# Patient Record
Sex: MALE | Race: OTHER | HISPANIC OR LATINO | Employment: OTHER | URBAN - METROPOLITAN AREA
[De-identification: names, ages, dates, MRNs, and addresses within clinical notes are randomized per-mention and may not be internally consistent; named-entity substitution may affect disease eponyms.]

---

## 2020-12-22 ENCOUNTER — VIRTUAL VISIT (OUTPATIENT)
Dept: FAMILY MEDICINE CLINIC | Age: 45
End: 2020-12-22

## 2020-12-22 DIAGNOSIS — G89.29 CHRONIC LOW BACK PAIN WITHOUT SCIATICA, UNSPECIFIED BACK PAIN LATERALITY: Primary | ICD-10-CM

## 2020-12-22 DIAGNOSIS — M54.50 CHRONIC LOW BACK PAIN WITHOUT SCIATICA, UNSPECIFIED BACK PAIN LATERALITY: Primary | ICD-10-CM

## 2020-12-22 PROCEDURE — 99443 PR PHYS/QHP TELEPHONE EVALUATION 21-30 MIN: CPT | Performed by: NURSE PRACTITIONER

## 2020-12-22 NOTE — PROGRESS NOTES
Avs discussed with Jeanette Alexander by Discharge Nurse Rommel Valentine LPN. Discussed patient getting covid testing done. Info given to patient on testing sites in his area. Patient verbalized understanding and has no further questions.   Rommel Valentine LPN

## 2020-12-22 NOTE — PROGRESS NOTES
: Parveen Salcedo  Patient identification verified with 2 identifiers. Consent: He and/or health care decision maker has provided verbal consent to proceed: Yes   Total Time: minutes: 21-30 minutes  Pursuant to the emergency declaration under the [facility] authority and the [employer] Act, this Virtual Telephone Visit was conducted to reduce the patient's risk of exposure to COVID-19. Assessment:   Diagnoses and all orders for this visit:    1. Chronic low back pain without sciatica, unspecified back pain laterality      Plan:  I communicated with the patient and/or health care decision maker about the following:    -get covid testing (chest pressure, difficulty breathing)  -then plan to see FATEMEH Antony is a 50 y.o. male evaluated via telephone on 12/22/2020. Patient identification verified with 2 identifiers. Chief Complaint   Patient presents with    Back Pain     pt c/o low back pain.  Other     pt requesting wellness check. History of Present Illness   Back pain for how long? A year. No injury. He often lifts heavy things of at least 50 lbs. Lower back in the middle. Able to work and to sit but feeling tired. He feels pressure in his chest. He states he sometimes has difficulty with breathing. It is a little bit. But not today. This has been going on for 1 year. He has pain in the left side of the chest without radiation. It's not severe (1/10), can last up to 5 hours or all day long, if he's had a bad day. It can get up to an 8/10, not today. Also feels like he wants to cry. He has history of a hernia in his belly button, 10 years ago. Denies SI/HI. Also has stabbing pain in the upper right abdomen once in a while, lasts for 1-2 seconds. Sometimes he feels a burning sensation when he urinates, not today.    No physical exam performed due to telephone visit. I affirm this is a Patient Initiated Episode with a Patient who has not had a related appointment within my department in the past 7 days or scheduled within the next 24 hours. ABELINO Russell expressed understanding and agreed to this plan.

## 2020-12-22 NOTE — PROGRESS NOTES
Coordination of Care  1. Have you been to the ER, urgent care clinic since your last visit? Hospitalized since your last visit? No    2. Have you seen or consulted any other health care providers outside of the [address] [place] [address] since your last visit? Include any pap smears or colon screening. No    Does the patient need refills? NO    Learning Assessment Complete? yes  Depression Screening complete in the past 12 months? yes    Per patient states last week he had fevers, chills, headaches, loss of appetite, muscle pain but he feels better now.

## 2021-01-04 ENCOUNTER — TELEPHONE (OUTPATIENT)
Dept: FAMILY MEDICINE CLINIC | Age: 46
End: 2021-01-04

## 2021-01-04 ENCOUNTER — HOSPITAL ENCOUNTER (OUTPATIENT)
Dept: LAB | Age: 46
Discharge: HOME | End: 2021-01-04

## 2021-01-04 ENCOUNTER — OFFICE VISIT (OUTPATIENT)
Dept: FAMILY MEDICINE CLINIC | Age: 46
End: 2021-01-04

## 2021-01-04 VITALS
HEIGHT: 70 IN | TEMPERATURE: 97.7 F | HEART RATE: 105 BPM | WEIGHT: 277 LBS | DIASTOLIC BLOOD PRESSURE: 73 MMHG | SYSTOLIC BLOOD PRESSURE: 139 MMHG | BODY MASS INDEX: 39.65 KG/M2

## 2021-01-04 DIAGNOSIS — R30.0 DYSURIA: Primary | ICD-10-CM

## 2021-01-04 DIAGNOSIS — R30.0 DYSURIA: ICD-10-CM

## 2021-01-04 DIAGNOSIS — L29.0 PRURITUS ANI: ICD-10-CM

## 2021-01-04 DIAGNOSIS — R39.9 LOWER URINARY TRACT SYMPTOMS (LUTS): ICD-10-CM

## 2021-01-04 LAB
BILIRUB UR QL STRIP: NEGATIVE
GLUCOSE UR-MCNC: NEGATIVE MG/DL
KETONES P FAST UR STRIP-MCNC: NEGATIVE MG/DL
PH UR STRIP: 6 [PH] (ref 4.6–8)
PROT UR QL STRIP: NEGATIVE
SP GR UR STRIP: 1.03 (ref 1–1.03)
UA UROBILINOGEN AMB POC: NORMAL (ref 0.2–1)
URINALYSIS CLARITY POC: CLEAR
URINALYSIS COLOR POC: NORMAL
URINE BLOOD POC: NORMAL
URINE LEUKOCYTES POC: NEGATIVE
URINE NITRITES POC: NEGATIVE

## 2021-01-04 PROCEDURE — 83036 HEMOGLOBIN GLYCOSYLATED A1C: CPT

## 2021-01-04 PROCEDURE — 99213 OFFICE O/P EST LOW 20 MIN: CPT | Performed by: FAMILY MEDICINE

## 2021-01-04 PROCEDURE — 80053 COMPREHEN METABOLIC PANEL: CPT

## 2021-01-04 PROCEDURE — 84153 ASSAY OF PSA TOTAL: CPT

## 2021-01-04 PROCEDURE — 87086 URINE CULTURE/COLONY COUNT: CPT

## 2021-01-04 PROCEDURE — 81002 URINALYSIS NONAUTO W/O SCOPE: CPT | Performed by: FAMILY MEDICINE

## 2021-01-04 PROCEDURE — 87491 CHLMYD TRACH DNA AMP PROBE: CPT

## 2021-01-04 RX ORDER — TAMSULOSIN HYDROCHLORIDE 0.4 MG/1
0.4 CAPSULE ORAL DAILY
Qty: 30 CAP | Refills: 3 | Status: SHIPPED | OUTPATIENT
Start: 2021-01-04

## 2021-01-04 RX ORDER — CIPROFLOXACIN 500 MG/1
500 TABLET ORAL 2 TIMES DAILY
Qty: 20 TAB | Refills: 0 | Status: SHIPPED | OUTPATIENT
Start: 2021-01-04 | End: 2021-01-14

## 2021-01-04 NOTE — PROGRESS NOTES
Coordination of Care  1. Have you been to the ER, urgent care clinic since your last visit? Hospitalized since your last visit? No    2. Have you seen or consulted any other health care providers outside of the [address] [place] [address] since your last visit? Include any pap smears or colon screening. No    Does the patient need refills? NO    Learning Assessment Complete?  yes  Depression Screening complete in the past 12 months? yes     Results for orders placed or performed in visit on 01/04/21   AMB POC URINALYSIS DIP STICK MANUAL W/O MICRO   Result Value Ref Range    Color (UA POC) Dark Yellow     Clarity (UA POC) Clear     Glucose (UA POC) Negative Negative    Bilirubin (UA POC) Negative Negative    Ketones (UA POC) Negative Negative    Specific gravity (UA POC) 1.030 1.001 - 1.035    Blood (UA POC) Trace Negative    pH (UA POC) 6.0 4.6 - 8.0    Protein (UA POC) Negative Negative    Urobilinogen (UA POC) 0.2 mg/dL 0.2 - 1    Nitrites (UA POC) Negative Negative    Leukocyte esterase (UA POC) Negative Negative

## 2021-01-04 NOTE — PROGRESS NOTES
HISTORY OF PRESENT ILLNESS  Reid Martell is a 45 y.o. male.  HPI  Patient states he has noticed burning sensation when he urinates, he has noticed this for the past 3-4 months.   He has been waking up at night to urinate.  He has noticed also some more resistance to pass urine.  He has not seen blood in the urine.  Recalls his father had a problem with prostate (father passed away with COVID a few months ago)    He has some rectal itch as well, for the past 3-4 weeks.  He has had some episodes of blood in stools and constipation.  Review of Systems   Constitutional: Negative for chills and fever.   HENT: Negative for ear pain, hearing loss and tinnitus.    Respiratory: Negative for cough, hemoptysis and sputum production.    Cardiovascular: Negative for chest pain and palpitations.   Gastrointestinal:        Rectal itch and episodes of blood in stool   Genitourinary: Positive for dysuria.   Skin: Negative for itching and rash.   /73 (BP 1 Location: Left arm, BP Patient Position: Sitting)   Pulse (!) 105   Temp 97.7 °F (36.5 °C)   Ht 5' 10.28\" (1.785 m)   Wt 277 lb (125.6 kg)   BMI 39.43 kg/m²   Physical Exam  Constitutional:       General: He is not in acute distress.     Appearance: Normal appearance. He is not ill-appearing.   HENT:      Head: Normocephalic.      Right Ear: Tympanic membrane normal.      Left Ear: Tympanic membrane normal.   Neck:      Musculoskeletal: Normal range of motion. No neck rigidity or muscular tenderness.   Cardiovascular:      Rate and Rhythm: Normal rate and regular rhythm.      Pulses: Normal pulses.      Heart sounds: No murmur.   Pulmonary:      Effort: Pulmonary effort is normal. No respiratory distress.      Breath sounds: No wheezing or rhonchi.   Genitourinary:     Comments: There is perianal irritation  Musculoskeletal:         General: No swelling or tenderness.   Neurological:      Mental Status: He is alert.         ASSESSMENT and  PLAN  Diagnoses and all orders for this visit:    1. Dysuria  -     AMB POC URINALYSIS DIP STICK MANUAL W/O MICRO  -     METABOLIC PANEL, COMPREHENSIVE; Future  -     HEMOGLOBIN A1C WITH EAG; Future  -     CULTURE, URINE; Future  -     CHLAMYDIA/GC PCR; Future  -     ciprofloxacin HCl (CIPRO) 500 mg tablet; Take 1 Tab by mouth two (2) times a day for 10 days. 2. Lower urinary tract symptoms (LUTS)  -     PSA, DIAGNOSTIC (PROSTATE SPECIFIC AG); Future  -     tamsulosin (Flomax) 0.4 mg capsule; Take 1 Cap by mouth daily. 3. Pruritus ani      We will check labs today  Start flomax for LUTS  Advise on dietary changes, may need to avoid the use of toilet paper as well.

## 2021-01-05 LAB
ALBUMIN SERPL-MCNC: 3.8 G/DL (ref 3.5–5)
ALBUMIN/GLOB SERPL: 1 {RATIO} (ref 1.1–2.2)
ALP SERPL-CCNC: 134 U/L (ref 45–117)
ALT SERPL-CCNC: 35 U/L (ref 12–78)
ANION GAP SERPL CALC-SCNC: 6 MMOL/L (ref 5–15)
AST SERPL-CCNC: 21 U/L (ref 15–37)
BILIRUB SERPL-MCNC: 0.4 MG/DL (ref 0.2–1)
BUN SERPL-MCNC: 8 MG/DL (ref 6–20)
BUN/CREAT SERPL: 10 (ref 12–20)
CALCIUM SERPL-MCNC: 8.6 MG/DL (ref 8.5–10.1)
CHLORIDE SERPL-SCNC: 107 MMOL/L (ref 97–108)
CO2 SERPL-SCNC: 28 MMOL/L (ref 21–32)
CREAT SERPL-MCNC: 0.82 MG/DL (ref 0.7–1.3)
EST. AVERAGE GLUCOSE BLD GHB EST-MCNC: 103 MG/DL
GLOBULIN SER CALC-MCNC: 4 G/DL (ref 2–4)
GLUCOSE SERPL-MCNC: 90 MG/DL (ref 65–100)
HBA1C MFR BLD: 5.2 % (ref 4–5.6)
POTASSIUM SERPL-SCNC: 4.1 MMOL/L (ref 3.5–5.1)
PROT SERPL-MCNC: 7.8 G/DL (ref 6.4–8.2)
PSA SERPL-MCNC: 1.2 NG/ML (ref 0.01–4)
SODIUM SERPL-SCNC: 141 MMOL/L (ref 136–145)

## 2021-01-06 LAB
BACTERIA SPEC CULT: NORMAL
C TRACH DNA SPEC QL NAA+PROBE: NEGATIVE
N GONORRHOEA DNA SPEC QL NAA+PROBE: NEGATIVE
SAMPLE TYPE: NORMAL
SERVICE CMNT-IMP: NORMAL
SERVICE CMNT-IMP: NORMAL
SPECIMEN SOURCE: NORMAL

## 2021-01-06 NOTE — PROGRESS NOTES
Normal prostate test in blood  Normal liver function, kidney function, electrolytes and sugar levels  No diabetes    We are still waiting on more results, patient can be informed

## 2021-01-07 NOTE — PROGRESS NOTES
Left a generic message for patient today asking the patient to call back to speak with a nurse for message from provider.

## 2021-01-07 NOTE — PROGRESS NOTES
Left a generic message for patient today asking the patient to call back to speak with a nurse for message from provider.  Danisha Pitts RN Patient called again.  Message left with negative results.  F/U with PMD as needed.  Call back with any questions.  Abrahan

## 2021-01-08 ENCOUNTER — TELEPHONE (OUTPATIENT)
Dept: FAMILY MEDICINE CLINIC | Age: 46
End: 2021-01-08

## 2021-01-08 NOTE — PROGRESS NOTES
Tc to the pt he had called the interpreters personal cell and left a message. The pt was spoken to he gave permission for his Dtr Annabella Devries to interpret for him. She is also on his PHI. The pt's dtr was given all of the pt's lab results.  They were all normal. Seda Ross RN

## 2021-01-08 NOTE — TELEPHONE ENCOUNTER
Daughter, Blayne Osorio (who is on the new PHI which will be scanned) called the nurse back about her fathers lab results.

## 2021-04-06 ENCOUNTER — VIRTUAL VISIT (OUTPATIENT)
Dept: FAMILY MEDICINE CLINIC | Age: 46
End: 2021-04-06

## 2021-04-06 DIAGNOSIS — K62.5 RECTAL BLEEDING: ICD-10-CM

## 2021-04-06 DIAGNOSIS — K59.00 CONSTIPATION, UNSPECIFIED CONSTIPATION TYPE: Primary | ICD-10-CM

## 2021-04-06 PROCEDURE — 99441 PR PHYS/QHP TELEPHONE EVALUATION 5-10 MIN: CPT | Performed by: FAMILY MEDICINE

## 2021-04-06 NOTE — PROGRESS NOTES
He Hudson (: 1975) is a 50 y.o. female, new patient, here for evaluation of the following chief complaint(s): Anal Bleeding       ASSESSMENT/PLAN:  1. Constipation, unspecified constipation type  Start Fiber supplement and recheck in person for exam and labs since this is a new problem. 2. Rectal bleeding  Only with wiping and occasionally with pain. Consider rectal fissure. Start fiber for constipation and will have patient seen for F2F exam.    Return in about 3 weeks (around 2021) for follow up for F2F in 3 weeks for check up on constipation and rectal bleeding. SUBJECTIVE/OBJECTIVE:  HPI  Rectal Bleeding:  Started noticing blood with wiping for 3 months. Has been more constipated during this time. When he is more constipated he develops pain into the Rt side of abdomen. Has some associated rectal pruritis and occasional pain. Denies fever, nausea, vomiting. Review of Systems   Constitutional: Negative for appetite change, diaphoresis and fever. Gastrointestinal: Positive for anal bleeding and constipation. Negative for blood in stool, nausea and vomiting. No flowsheet data found. Physical Exam    On this date 2021 I have spent 6 minutes reviewing previous notes, test results and face to face (virtual) with the patient discussing the diagnosis and importance of compliance with the treatment plan as well as documenting on the day of the visit. He Hudson is being evaluated by a Virtual Visit (phone visit) encounter to address concerns as mentioned above. A caregiver was present when appropriate. Due to this being a TeleHealth encounter (During [place] [id number] public [facility] emergency), evaluation of the following organ systems was limited: Vitals/Constitutional/EENT/Resp/CV/GI//MS/Neuro/Skin/Heme-Lymph-Imm.   Pursuant to the emergency declaration under the [address] [facility] [place] [address] and Response Supplemental Appropriations Act, this Virtual Visit was conducted with patient's (and/or legal guardian's) consent, to reduce the patient's risk of exposure to COVID-19 and provide necessary medical care. The patient (and/or legal guardian) has also been advised to contact this office for worsening conditions or problems, and seek emergency medical treatment and/or call 911 if deemed necessary. Patient identification was verified at the start of the visit: YES    Services were provided through a phone synchronous discussion virtually to substitute for in-person clinic visit. Patient was located at home and provider was located in office or at home. An electronic signature was used to authenticate this note.   -- Lucas Huertas MD

## 2021-04-06 NOTE — PROGRESS NOTES
I called patient today with Danny Farley as an . I reviewed with patient to buy otc fiber supplement to take daily: Metamucil or Benefiber. Patient also knows that he will have a f2f appointment scheduled for follow up in about 3 weeks.  Trent Sung RN None

## 2021-04-29 ENCOUNTER — OFFICE VISIT (OUTPATIENT)
Dept: FAMILY MEDICINE CLINIC | Age: 46
End: 2021-04-29

## 2021-04-29 ENCOUNTER — HOSPITAL ENCOUNTER (OUTPATIENT)
Dept: LAB | Age: 46
Discharge: HOME | End: 2021-04-29

## 2021-04-29 VITALS
WEIGHT: 287.2 LBS | SYSTOLIC BLOOD PRESSURE: 137 MMHG | HEIGHT: 70 IN | TEMPERATURE: 98 F | DIASTOLIC BLOOD PRESSURE: 74 MMHG | HEART RATE: 94 BPM | BODY MASS INDEX: 41.12 KG/M2

## 2021-04-29 DIAGNOSIS — K92.1 BLOOD IN STOOL: ICD-10-CM

## 2021-04-29 DIAGNOSIS — K59.00 CONSTIPATION, UNSPECIFIED CONSTIPATION TYPE: ICD-10-CM

## 2021-04-29 DIAGNOSIS — K59.00 CONSTIPATION, UNSPECIFIED CONSTIPATION TYPE: Primary | ICD-10-CM

## 2021-04-29 PROCEDURE — 99213 OFFICE O/P EST LOW 20 MIN: CPT | Performed by: FAMILY MEDICINE

## 2021-04-29 PROCEDURE — 84443 ASSAY THYROID STIM HORMONE: CPT

## 2021-04-29 PROCEDURE — 85025 COMPLETE CBC W/AUTO DIFF WBC: CPT

## 2021-04-29 PROCEDURE — 80053 COMPREHEN METABOLIC PANEL: CPT

## 2021-04-29 NOTE — PROGRESS NOTES
Coordination of Care  1. Have you been to the ER, urgent care clinic since your last visit? Hospitalized since your last visit? No    2. Have you seen or consulted any other health care providers outside of the [address] [place] [address] since your last visit? Include any pap smears or colon screening. No    Does the patient need refills? NO    Learning Assessment Complete?  yes  Depression Screening complete in the past 12 months? yes

## 2021-04-29 NOTE — PROGRESS NOTES
I have printed AVS and reviewed it with patient today. Explained to patient  that Carlos Rivas or Jose Waller will be meeting with them to complete application for [facility] referral to see GI. They know that they will get a phone call to schedule this appointment.  Paulina Peters RN

## 2021-04-29 NOTE — PROGRESS NOTES
Lula Beltrán (: 1975) is a 50 y.o. male, established patient, here for evaluation of the following chief complaint(s):  Follow-up       ASSESSMENT/PLAN:  1. Constipation, unspecified constipation type  Improved with diet and fiber. This is a new change in BM with blood in stool and so will refer for colonoscopy. Will check labs. -     REFERRAL TO GASTROENTEROLOGY  -     METABOLIC PANEL, COMPREHENSIVE; Future  -     TSH 3RD GENERATION; Future  2. Blood in stool  -     REFERRAL TO GASTROENTEROLOGY  -     CBC WITH AUTOMATED DIFF; Future      SUBJECTIVE:  HPI  Constipation/Rectal Bleeding:  Noticing blood with wiping for 3 months with more constipation. Bleeding has resolved since starting Fiber supplement. When he is more constipated he develops pain into the Rt side of abdomen. Denies fever, nausea, vomiting. Had labs 3 months ago at outside clinic and told everything was OK. Review of Systems   Constitutional: Negative for chills, diaphoresis, fatigue, fever and unexpected weight change. Respiratory: Negative for cough. Cardiovascular: Negative for chest pain. Gastrointestinal: Positive for blood in stool and constipation. Negative for abdominal pain. Neurological: Negative for syncope and light-headedness. OBJECTIVE:  Blood pressure 137/74, pulse 94, temperature 98 °F (36.7 °C), height 5' 9.61\" (1.768 m), weight 287 lb 3.2 oz (130.3 kg). Physical Exam  CONSTITUTIONAL:  Well developed. No apparent distress. PSYCHIATRIC: Oriented to time, place, person & situation. Appropriate mood and affect. NECK:  Normal inspection, normal palpation without any lymphadenopathy, masses, or thyromegaly  CARDIOVASCULAR:  Regular rate and rhythm. Normal S1, S2. No extra sounds. RESPIRATORY:  Normal effort. Normal ascultation without wheezing. ABDOMEN:  Normal bowel sounds. Abdomen soft, non tender. No hepatosplenomegaly or masses.   VASCULAR:  Normal Carotid pulses without bruits. Normal Posterior Tibialis pulses. No abdominal or femoral bruits. EXTREMITIES:  No edema. No results found for this visit on 04/29/21. An electronic signature was used to authenticate this note.   -- Irma Hall MD

## 2021-04-30 LAB
ALBUMIN SERPL-MCNC: 3.8 G/DL (ref 3.5–5)
ALBUMIN/GLOB SERPL: 1 {RATIO} (ref 1.1–2.2)
ALP SERPL-CCNC: 130 U/L (ref 45–117)
ALT SERPL-CCNC: 33 U/L (ref 12–78)
ANION GAP SERPL CALC-SCNC: 6 MMOL/L (ref 5–15)
AST SERPL-CCNC: 28 U/L (ref 15–37)
BASOPHILS # BLD: 0 K/UL (ref 0–0.1)
BASOPHILS NFR BLD: 1 % (ref 0–1)
BILIRUB SERPL-MCNC: 0.4 MG/DL (ref 0.2–1)
BUN SERPL-MCNC: 10 MG/DL (ref 6–20)
BUN/CREAT SERPL: 16 (ref 12–20)
CALCIUM SERPL-MCNC: 8.6 MG/DL (ref 8.5–10.1)
CHLORIDE SERPL-SCNC: 107 MMOL/L (ref 97–108)
CO2 SERPL-SCNC: 27 MMOL/L (ref 21–32)
CREAT SERPL-MCNC: 0.64 MG/DL (ref 0.7–1.3)
DIFFERENTIAL METHOD BLD: ABNORMAL
EOSINOPHIL # BLD: 0.1 K/UL (ref 0–0.4)
EOSINOPHIL NFR BLD: 3 % (ref 0–7)
ERYTHROCYTE [DISTWIDTH] IN BLOOD BY AUTOMATED COUNT: 13.2 % (ref 11.5–14.5)
GLOBULIN SER CALC-MCNC: 3.8 G/DL (ref 2–4)
GLUCOSE SERPL-MCNC: 118 MG/DL (ref 65–100)
HCT VFR BLD AUTO: 46.2 % (ref 36.6–50.3)
HGB BLD-MCNC: 15.4 G/DL (ref 12.1–17)
IMM GRANULOCYTES # BLD AUTO: 0 K/UL (ref 0–0.04)
IMM GRANULOCYTES NFR BLD AUTO: 1 % (ref 0–0.5)
LYMPHOCYTES # BLD: 2.4 K/UL (ref 0.8–3.5)
LYMPHOCYTES NFR BLD: 42 % (ref 12–49)
MCH RBC QN AUTO: 30.3 PG (ref 26–34)
MCHC RBC AUTO-ENTMCNC: 33.3 G/DL (ref 30–36.5)
MCV RBC AUTO: 90.9 FL (ref 80–99)
MONOCYTES # BLD: 0.4 K/UL (ref 0–1)
MONOCYTES NFR BLD: 8 % (ref 5–13)
NEUTS SEG # BLD: 2.7 K/UL (ref 1.8–8)
NEUTS SEG NFR BLD: 45 % (ref 32–75)
NRBC # BLD: 0 K/UL (ref 0–0.01)
NRBC BLD-RTO: 0 PER 100 WBC
PLATELET # BLD AUTO: 170 K/UL (ref 150–400)
PMV BLD AUTO: 12.2 FL (ref 8.9–12.9)
POTASSIUM SERPL-SCNC: 3.8 MMOL/L (ref 3.5–5.1)
PROT SERPL-MCNC: 7.6 G/DL (ref 6.4–8.2)
RBC # BLD AUTO: 5.08 M/UL (ref 4.1–5.7)
SODIUM SERPL-SCNC: 140 MMOL/L (ref 136–145)
TSH SERPL DL<=0.05 MIU/L-ACNC: 1.19 UIU/ML (ref 0.36–3.74)
WBC # BLD AUTO: 5.7 K/UL (ref 4.1–11.1)

## 2021-04-30 NOTE — PROGRESS NOTES
Blood sugar is a little elevated. Would work on diet and weight loss. Alk phos is mildly elevated which can be from mild liver or gallbladder irritation. If he has no symptoms I would just recheck in 3 months.   Other labs look normal.

## 2021-05-05 ENCOUNTER — VIRTUAL VISIT (OUTPATIENT)
Dept: FAMILY MEDICINE CLINIC | Age: 46
End: 2021-05-05

## 2021-05-05 ENCOUNTER — TELEPHONE (OUTPATIENT)
Dept: FAMILY MEDICINE CLINIC | Age: 46
End: 2021-05-05

## 2021-05-05 DIAGNOSIS — Z71.89 COUNSELING AND COORDINATION OF CARE: Primary | ICD-10-CM

## 2021-05-05 NOTE — TELEPHONE ENCOUNTER
Tristen from the pt's Dtr Raj Oden. There are no PHI forms in the media at this time. The pt's dtr identified the pt by name and . She is asking the nurse when is someone was going to call them about the specialist appt. The chart was reviewed and the pt has an AN referral to the GI specialist. Explained [facility] process to the Dtr. The Dtr was told they would need to make an appt with the OW, who will meet with them for Financial information, and assist them in the application process. The pt also has a VV appt today with the OW at 2:30pm. The pt's dtr stated they were unaware of it. The pt's dtr verbalized understanding and will relay the message to the pt.  Kirstin Cueto RN

## 2021-05-06 ENCOUNTER — TELEPHONE (OUTPATIENT)
Dept: FAMILY MEDICINE CLINIC | Age: 46
End: 2021-05-06

## 2021-05-06 NOTE — PROGRESS NOTES
Patient has been screened and scheduled to complete  AN  Financial screening. Phone numbers have been updated.

## 2021-05-06 NOTE — TELEPHONE ENCOUNTER
Received another phone call from the pt's Dtr Ana Mares on the [facility]. She stated she is still waiting for the CAV to call with the appt for the pt. The dtr mentioned she had called yesterday and had been told the pt had an appt yesterday over the phone at 2:30 pm with the OW but the whole day passed and no one ever called. This nurse called the OW. The OW stated she has been calling the pt multiple times on his number. The OW was given the Dtr Namrata's number to call her. The OW stated she would call her now. The OW will clarify with the Dtr that the phone number for the pt in his chart is correct or does this number need to be changed.  Uzair Rivera RN

## 2021-05-11 ENCOUNTER — OFFICE VISIT (OUTPATIENT)
Dept: FAMILY MEDICINE CLINIC | Age: 46
End: 2021-05-11

## 2021-05-11 DIAGNOSIS — Z71.89 COUNSELING AND COORDINATION OF CARE: Primary | ICD-10-CM

## 2021-05-11 PROCEDURE — 99080 SPECIAL REPORTS OR FORMS: CPT | Performed by: FAMILY MEDICINE

## 2021-05-11 NOTE — PROGRESS NOTES
Left a generic message for patient today asking the patient to call back to speak with a nurse for message from provider. I called patient today with Christo Merrill as .  Marlo Poole, RN

## 2021-05-11 NOTE — PROGRESS NOTES
AN financial screening has been completed. Patient has been instructed to call appointment line on or after 5/25/21.

## 2021-05-21 NOTE — PROGRESS NOTES
Tc to the pt. His dtr Romi, she is on the PHI. The dtr was given the entire lab result message. She asked that the provider be told of the pt's pain. He has pain in right back, right lower side. Hurts after sitting a couple of minutes. Also goes to bathroom a lot to urinate. She also stated the pt is constipated. The dtr was given the same day appt line to call for an appt if needed, and reviewed with her the AN GI referral, and when to call the number for the AN GI referral confirmation and appt. She verbalized understanding.  Jenna Arnold RN

## 2021-05-21 NOTE — PROGRESS NOTES
Agree with making another appointment for his back pain and urine frequency. We can check urine in office.     For constipation and blood in stool follow up with GI.

## 2021-05-21 NOTE — PROGRESS NOTES
Tc to the pt's Dtr with the message from Dr Sundeep Antony pt should have an appt. The pt is scheduled for appt on 05/27/21 at 2pm at the [facility].  Yadira Vang RN

## 2021-05-25 ENCOUNTER — TELEPHONE (OUTPATIENT)
Dept: FAMILY MEDICINE CLINIC | Age: 46
End: 2021-05-25

## 2021-05-27 ENCOUNTER — OFFICE VISIT (OUTPATIENT)
Dept: FAMILY MEDICINE CLINIC | Age: 46
End: 2021-05-27

## 2021-05-27 ENCOUNTER — HOSPITAL ENCOUNTER (OUTPATIENT)
Dept: LAB | Age: 46
Discharge: HOME | End: 2021-05-27

## 2021-05-27 VITALS
OXYGEN SATURATION: 97 % | SYSTOLIC BLOOD PRESSURE: 136 MMHG | HEIGHT: 69 IN | BODY MASS INDEX: 42.27 KG/M2 | TEMPERATURE: 98.4 F | DIASTOLIC BLOOD PRESSURE: 62 MMHG | HEART RATE: 85 BPM | WEIGHT: 285.4 LBS

## 2021-05-27 DIAGNOSIS — R10.11 RUQ ABDOMINAL PAIN: Primary | ICD-10-CM

## 2021-05-27 DIAGNOSIS — M54.50 RIGHT-SIDED LOW BACK PAIN WITHOUT SCIATICA, UNSPECIFIED CHRONICITY: ICD-10-CM

## 2021-05-27 DIAGNOSIS — R80.9 PROTEINURIA, UNSPECIFIED TYPE: ICD-10-CM

## 2021-05-27 DIAGNOSIS — R10.11 RUQ ABDOMINAL PAIN: ICD-10-CM

## 2021-05-27 LAB
BILIRUB UR QL STRIP: NEGATIVE
GLUCOSE UR-MCNC: NEGATIVE MG/DL
KETONES P FAST UR STRIP-MCNC: NEGATIVE MG/DL
PH UR STRIP: 6 [PH] (ref 4.6–8)
PROT UR QL STRIP: NORMAL
SP GR UR STRIP: 1.03 (ref 1–1.03)
UA UROBILINOGEN AMB POC: NORMAL (ref 0.2–1)
URINALYSIS CLARITY POC: CLEAR
URINALYSIS COLOR POC: YELLOW
URINE BLOOD POC: NEGATIVE
URINE LEUKOCYTES POC: NEGATIVE
URINE NITRITES POC: NEGATIVE

## 2021-05-27 PROCEDURE — 80053 COMPREHEN METABOLIC PANEL: CPT

## 2021-05-27 PROCEDURE — 99213 OFFICE O/P EST LOW 20 MIN: CPT | Performed by: FAMILY MEDICINE

## 2021-05-27 PROCEDURE — 81003 URINALYSIS AUTO W/O SCOPE: CPT

## 2021-05-27 PROCEDURE — 87086 URINE CULTURE/COLONY COUNT: CPT

## 2021-05-27 PROCEDURE — 81002 URINALYSIS NONAUTO W/O SCOPE: CPT | Performed by: FAMILY MEDICINE

## 2021-05-27 NOTE — PROGRESS NOTES
Rivera Anglin (: 1975) is a 50 y.o. male, established patient, here for evaluation of the following chief complaint(s): Anal Pain and Abdominal Pain (with low back pain)       ASSESSMENT/PLAN:  1. RUQ abdominal pain  Radiating to back, will get US RUQ and repeat LFTs.  -     US ABD LTD; Future  -     METABOLIC PANEL, COMPREHENSIVE; Future  2. Right-sided low back pain without sciatica, unspecified chronicity  -     AMB POC URINALYSIS DIP STICK MANUAL W/O MICRO  3. Proteinuria, unspecified type  Mild, isolated on dip. Get U/A w micro and Cx.  -     URINALYSIS W/ RFLX MICROSCOPIC; Future  -     CULTURE, URINE; Future      SUBJECTIVE:  HPI  Has called but has not received a call back from GI.  RUQ Abdominal Pain: present in a mild manner off/on for 1 year but is more severe and persistent x 1 week. Has noted some rectal pruritis when his RUQ pain is worse. RUQ pain will radiate to the Rt back. Denies fevers, chills, nausea, vomiting. Constipation:  Patient is taking supplement regularly with less constipation. Has not noted any recent blood in stool. Review of Systems   Constitutional: Negative for chills, diaphoresis, fatigue, fever and unexpected weight change. Respiratory: Negative for cough. Cardiovascular: Negative for chest pain. Gastrointestinal: Positive for abdominal pain and rectal pain. Negative for abdominal distention, blood in stool, constipation, diarrhea and nausea. Genitourinary: Negative for dysuria. OBJECTIVE:  Blood pressure 136/62, pulse 85, temperature 98.4 °F (36.9 °C), height 5' 9.49\" (1.765 m), weight 285 lb 6.4 oz (129.5 kg), SpO2 97 %. Physical Exam  CONSTITUTIONAL:  Well developed. No apparent distress. PSYCHIATRIC: Oriented to time, place, person & situation. Appropriate mood and affect. CARDIOVASCULAR:  Regular rate and rhythm. Normal S1, S2. No extra sounds. RESPIRATORY:  Normal effort. Normal ascultation without wheezing.   ABDOMEN: Obese.  Normal bowel sounds. Abdomen soft, mild RUQ tenderness at lower edge of Rt costal margin. No hepatosplenomegaly or masses. No CVA tenderness. Results for orders placed or performed in visit on 05/27/21   AMB POC URINALYSIS DIP STICK MANUAL W/O MICRO   Result Value Ref Range    Color (UA POC) Yellow     Clarity (UA POC) Clear     Glucose (UA POC) Negative Negative    Bilirubin (UA POC) Negative Negative    Ketones (UA POC) Negative Negative    Specific gravity (UA POC) 1.030 1.001 - 1.035    Blood (UA POC) Negative Negative    pH (UA POC) 6.0 4.6 - 8.0    Protein (UA POC) 1+ Negative    Urobilinogen (UA POC) 0.2 mg/dL 0.2 - 1    Nitrites (UA POC) Negative Negative    Leukocyte esterase (UA POC) Negative Negative         An electronic signature was used to authenticate this note.   -- Wiley Spaulding MD

## 2021-05-27 NOTE — PROGRESS NOTES
I have printed AVS and reviewed it with patient today. I gave patient list of [facility] locations/addresses  and central scheduling phone number to call to schedule the ordered tests today. Patient instructed to ask for an  if they need one. I gave patient a copy of the financial assistance application today. I have explained to patient that they must complete this after they have received their first bill from [facility] and that they need to put the account number of a bill on the application and mail it to address at bottom of application. I told patient that it might take 90 days to hear from [address] [place] [address] and to call the billing office on each bill in the meantime to have account put on hold. Patient also told to call [facility] after 90 days to speak with  if they need further assistance with navigating this process. Carolina Daniel RN I called patient today with [facility] [employer]  # [phone] . Carolina Daniel RN

## 2021-05-27 NOTE — PROGRESS NOTES
Coordination of Care  1. Have you been to the ER, urgent care clinic since your last visit? Hospitalized since your last visit? No    2. Have you seen or consulted any other health care providers outside of the [address] [place] [address] since your last visit? Include any pap smears or colon screening. No    Does the patient need refills? NO    Learning Assessment Complete?  yes  Depression Screening complete in the past 12 months? yes     PT is taking medicine to pain    Results for orders placed or performed in visit on 05/27/21   AMB POC URINALYSIS DIP STICK MANUAL W/O MICRO   Result Value Ref Range    Color (UA POC) Yellow     Clarity (UA POC) Clear     Glucose (UA POC) Negative Negative    Bilirubin (UA POC) Negative Negative    Ketones (UA POC) Negative Negative    Specific gravity (UA POC) 1.030 1.001 - 1.035    Blood (UA POC) Negative Negative    pH (UA POC) 6.0 4.6 - 8.0    Protein (UA POC) 1+ Negative    Urobilinogen (UA POC) 0.2 mg/dL 0.2 - 1    Nitrites (UA POC) Negative Negative    Leukocyte esterase (UA POC) Negative Negative

## 2021-05-28 LAB
ALBUMIN SERPL-MCNC: 4.1 G/DL (ref 3.5–5)
ALBUMIN/GLOB SERPL: 1.1 {RATIO} (ref 1.1–2.2)
ALP SERPL-CCNC: 118 U/L (ref 45–117)
ALT SERPL-CCNC: 45 U/L (ref 12–78)
ANION GAP SERPL CALC-SCNC: 5 MMOL/L (ref 5–15)
APPEARANCE UR: ABNORMAL
AST SERPL-CCNC: 31 U/L (ref 15–37)
BACTERIA URNS QL MICRO: NEGATIVE /HPF
BILIRUB SERPL-MCNC: 0.3 MG/DL (ref 0.2–1)
BILIRUB UR QL: NEGATIVE
BUN SERPL-MCNC: 11 MG/DL (ref 6–20)
BUN/CREAT SERPL: 14 (ref 12–20)
CALCIUM SERPL-MCNC: 8.5 MG/DL (ref 8.5–10.1)
CHLORIDE SERPL-SCNC: 108 MMOL/L (ref 97–108)
CO2 SERPL-SCNC: 27 MMOL/L (ref 21–32)
COLOR UR: ABNORMAL
CREAT SERPL-MCNC: 0.77 MG/DL (ref 0.7–1.3)
EPITH CASTS URNS QL MICRO: ABNORMAL /LPF
GLOBULIN SER CALC-MCNC: 3.7 G/DL (ref 2–4)
GLUCOSE SERPL-MCNC: 86 MG/DL (ref 65–100)
GLUCOSE UR STRIP.AUTO-MCNC: NEGATIVE MG/DL
HGB UR QL STRIP: NEGATIVE
HYALINE CASTS URNS QL MICRO: ABNORMAL /LPF (ref 0–5)
KETONES UR QL STRIP.AUTO: ABNORMAL MG/DL
LEUKOCYTE ESTERASE UR QL STRIP.AUTO: NEGATIVE
NITRITE UR QL STRIP.AUTO: NEGATIVE
PH UR STRIP: 5.5 [PH] (ref 5–8)
POTASSIUM SERPL-SCNC: 4.4 MMOL/L (ref 3.5–5.1)
PROT SERPL-MCNC: 7.8 G/DL (ref 6.4–8.2)
PROT UR STRIP-MCNC: NEGATIVE MG/DL
RBC #/AREA URNS HPF: ABNORMAL /HPF (ref 0–5)
SODIUM SERPL-SCNC: 140 MMOL/L (ref 136–145)
SP GR UR REFRACTOMETRY: 1.03 (ref 1–1.03)
UROBILINOGEN UR QL STRIP.AUTO: 0.2 EU/DL (ref 0.2–1)
WBC URNS QL MICRO: ABNORMAL /HPF (ref 0–4)

## 2021-05-28 NOTE — PROGRESS NOTES
Liver enzymes are normal.    Lab urine test does not show protein, patient does not need 3 month follow up to have this rechecked (he was given a 3month follow up to recheck this but now does not need it)

## 2021-05-29 ENCOUNTER — HOSPITAL ENCOUNTER (OUTPATIENT)
Dept: ULTRASOUND IMAGING | Age: 46
Discharge: HOME | End: 2021-05-29
Attending: FAMILY MEDICINE

## 2021-05-29 DIAGNOSIS — R10.11 RUQ ABDOMINAL PAIN: ICD-10-CM

## 2021-05-29 LAB
BACTERIA SPEC CULT: NORMAL
SERVICE CMNT-IMP: NORMAL

## 2021-05-29 PROCEDURE — 76705 ECHO EXAM OF ABDOMEN: CPT

## 2021-06-02 ENCOUNTER — TELEPHONE (OUTPATIENT)
Dept: FAMILY MEDICINE CLINIC | Age: 46
End: 2021-06-02

## 2021-06-02 NOTE — TELEPHONE ENCOUNTER
Tc from the Dtr. She is asking for the U/S results from last week. The Dtr is Olesya Garner, on the [address] [place] [address]. She was given the results from the provider. That the U/S showed some fatty liver otherwise normal. Normal kidneys. The dtr then asked if the pt had been denied AN. The chart was reviewed. I did not see a note stating he ad been denied AN. She stated they called on the 05/25/21 and they told the pt they did not have his referral. This message was routed to the AN coordinator. The dtr was told she would review the chart and contact AN if needed or the pt. The dtr was asked which number they called and she did not know the AN number was given to the Dtr. She will try to call for the pt this evening and if they are closed she will call tomorrow.  Sharon Payne RN

## 2021-06-07 NOTE — TELEPHONE ENCOUNTER
Per the notes in the [facility] database, the patient's wife spoke with [facility] on 05-25-21 about the patient's referral to GI. The patient's information was confirmed and then sent to the GI office for review. As of 06-01-21, [facility] is waiting for appointment information for the patient from the GI office.  Leanna Jordan RN

## 2021-06-08 NOTE — PROGRESS NOTES
The pt's dtr Paulo Haque, (she is on the [address]) has already been given the pt's U/S results see telephone encounter.  Ariana Cox RN

## 2021-06-08 NOTE — PROGRESS NOTES
Tc to the pt with the  Danny Farley. The pt was being called to give him his lab results message from the provider. Samia Crocker was given the lab message from the provider. She is on the pt's PHI. She verbalized understanding. She had no further questions. A message was sent to the front office that the pt did not need a 3 month re-check appt for labs.  Yadira Vang RN

## 2021-10-14 ENCOUNTER — TELEPHONE (OUTPATIENT)
Dept: FAMILY MEDICINE CLINIC | Age: 46
End: 2021-10-14

## 2021-10-14 NOTE — TELEPHONE ENCOUNTER
Tc from the pt's spouse. With Int # [id number]. Octavio Perez. She is on the PHI. She is calling the [facility] for an appt for the pt. He has complaints of a growth on his eye, and blurry vision. He has had this problem for a long time. the spouse can not remember when it started. The problem is NOT new, but the pt has not mentioned it or had it evaluated at any of his prior appts before per the spouse. The pt was scheduled for an appt 10/28/21, at 0950 am, at the [address] where he had been seen before. He was given the address. She stated he is having no sxs of Covid virus at this time.  Meera Galaviz RN

## 2021-10-28 ENCOUNTER — OFFICE VISIT (OUTPATIENT)
Dept: FAMILY MEDICINE CLINIC | Age: 46
End: 2021-10-28

## 2021-10-28 VITALS
HEIGHT: 69 IN | SYSTOLIC BLOOD PRESSURE: 131 MMHG | DIASTOLIC BLOOD PRESSURE: 78 MMHG | OXYGEN SATURATION: 95 % | BODY MASS INDEX: 41.77 KG/M2 | TEMPERATURE: 97.9 F | HEART RATE: 77 BPM | WEIGHT: 282 LBS

## 2021-10-28 DIAGNOSIS — H11.003 PTERYGIUM OF BOTH EYES: ICD-10-CM

## 2021-10-28 DIAGNOSIS — H53.9 VISION CHANGES: Primary | ICD-10-CM

## 2021-10-28 DIAGNOSIS — K59.00 CONSTIPATION, UNSPECIFIED CONSTIPATION TYPE: ICD-10-CM

## 2021-10-28 DIAGNOSIS — K26.9 DUODENAL ULCER: ICD-10-CM

## 2021-10-28 PROCEDURE — 99213 OFFICE O/P EST LOW 20 MIN: CPT | Performed by: FAMILY MEDICINE

## 2021-10-28 RX ORDER — LEVOFLOXACIN 500 MG/1
TABLET, FILM COATED ORAL
COMMUNITY
Start: 2021-08-05

## 2021-10-28 RX ORDER — AMOXICILLIN 500 MG/1
CAPSULE ORAL
COMMUNITY
Start: 2021-08-05

## 2021-10-28 RX ORDER — POLYETHYLENE GLYCOL 3350, SODIUM CHLORIDE, SODIUM BICARBONATE, POTASSIUM CHLORIDE 420; 11.2; 5.72; 1.48 G/4L; G/4L; G/4L; G/4L
POWDER, FOR SOLUTION ORAL
COMMUNITY
Start: 2021-07-27

## 2021-10-28 RX ORDER — OMEPRAZOLE 40 MG/1
CAPSULE, DELAYED RELEASE ORAL
COMMUNITY
Start: 2021-08-05

## 2021-10-28 NOTE — PROGRESS NOTES
I have printed AVS and reviewed it with patient today. I have copied the provider's check out note here and have reviewed these items with the patient today: Check-out Note: Referral to [facility]   I explained to the patient that he will receive a call to start the process for [facility].  # [id number] with [facility] assisted. Patient verbalized understanding.  Jing Shelby RN

## 2021-10-28 NOTE — PROGRESS NOTES
Karson Seo (: 1975) is a 48 y.o. male, established patient, here for evaluation of the following chief complaint(s):  Eye Problem (pt states has cataracts x 10 years)       ASSESSMENT/PLAN:  1. Vision changes  More recent changes, will have him see Ophthalmology for evaluation.  -     REFERRAL TO OPHTHALMOLOGY  2. Pterygium of both eyes  Discussed that these do not appear to be causing his vision changes. 3. Constipation, unspecified constipation type  Controlled with fiber supplement. 4. Duodenal ulcer  Hx of H. Pylori +. Sx resolved, follow up with GI.      SUBJECTIVE:  HPI  Eye Problem:  Having more difficulty with bright lights, reading at times. Lt worse than Rt. Father had cataracts. Duodenal Ulcer:  Evaluated by GI. Had Endoscopy/Colonoscopy 2021 showing duodenal ulcer. Was H.Pylori + and was tx with abx by GI. He is feeling better. He is no longer on PPI. Constipation: This is controlled with taking fiber supplement regularly. Review of Systems   Eyes: Negative for photophobia and pain. Gastrointestinal: Negative for abdominal pain, constipation and diarrhea. Neurological: Negative for headaches. OBJECTIVE:  Blood pressure 131/78, pulse 77, temperature 97.9 °F (36.6 °C), temperature source Temporal, height 5' 9.49\" (1.765 m), weight 282 lb (127.9 kg), SpO2 95 %. Physical Exam  CONSTITUTIONAL:  Well developed. No apparent distress. PSYCHIATRIC: Oriented to time, place, person & situation. Appropriate mood and affect. EYE: EOEMi, PERRL. No photophobia. Lids without swelling. Conjunctiva without erythema. Sclera with pyrigiums B with 5 mm encroachment on cornea. No results found for this visit on 10/28/21. An electronic signature was used to authenticate this note.   -- Bill Lujan MD
